# Patient Record
Sex: MALE | Race: WHITE
[De-identification: names, ages, dates, MRNs, and addresses within clinical notes are randomized per-mention and may not be internally consistent; named-entity substitution may affect disease eponyms.]

---

## 2018-06-15 ENCOUNTER — HOSPITAL ENCOUNTER (EMERGENCY)
Dept: HOSPITAL 17 - NEPD | Age: 28
Discharge: LEFT BEFORE DISCHARGE | End: 2018-06-15
Payer: SELF-PAY

## 2018-06-15 VITALS — WEIGHT: 159.84 LBS | HEIGHT: 68 IN | BODY MASS INDEX: 24.22 KG/M2

## 2018-06-15 VITALS
SYSTOLIC BLOOD PRESSURE: 129 MMHG | OXYGEN SATURATION: 99 % | TEMPERATURE: 97.9 F | DIASTOLIC BLOOD PRESSURE: 82 MMHG | RESPIRATION RATE: 18 BRPM | HEART RATE: 92 BPM

## 2018-06-15 DIAGNOSIS — G47.00: Primary | ICD-10-CM

## 2018-06-15 PROCEDURE — 99281 EMR DPT VST MAYX REQ PHY/QHP: CPT

## 2018-06-15 NOTE — PD
HPI


Chief Complaint:  Medication Refill Request


Time Seen by Provider:  16:09


Travel History


International Travel<30 days:  No


Contact w/Intl Traveler<30days:  No


Traveled to known affect area:  No





History of Present Illness


HPI


Patient is a 27-year-old male presenting to the emergency department for a 

medication refill.  Patient states he is on temazepam 30 mg at night.  He has 

been taking this medication for approximately 1 month.  He states that he has 

been out for 2 days and is having a difficult time sleeping.  He reports that 

he went to his primary doctor's office this morning however they were close.  

He has no other complaints at this time.





[facility]


Past Medical History


Insomnia:  Yes





Social History


Tobacco Use:  No





Allergies-Medications


(Allergen,Severity, Reaction):  


Coded Allergies:  


     Penicillins (Verified  Allergy, Severe, Anaphylaxis, 6/15/18)


     albuterol (Verified  Allergy, Severe, Anaphylaxis, 6/15/18)


     ibuprofen (Verified  Allergy, Severe, Swelling, 6/15/18)


     ipratropium (Verified  Allergy, Severe, Anaphylaxis, 6/15/18)


Uncoded Allergies:  


     ARCOXIA (Allergy, Severe, Swelling, 6/15/18)


     PARACETAMOL (Allergy, Severe, Anaphylaxis, 6/15/18)





Review of Systems


Except as stated in HPI:  all other systems reviewed are Neg


General / Constitutional:  Positive: Other (Insomnia)





Physical Exam


Narrative


GENERAL: Well-developed, well-nourished, alert male.  Presenting in no acute 

distress.


SKIN: Warm and dry.


HEAD: Normocephalic.


EYES: No scleral icterus. No injection or drainage. 


NECK: Supple, trachea midline. No JVD or lymphadenopathy.


CARDIOVASCULAR: Regular rate and rhythm without murmurs, gallops, or rubs. 


RESPIRATORY: Breath sounds equal bilaterally. No accessory muscle use.


GASTROINTESTINAL: Abdomen soft, non-tender, nondistended. 


MUSCULOSKELETAL: No cyanosis, or edema. 


BACK: Nontender without obvious deformity. No CVA tenderness.








Data


Data


Last Documented VS





Vital Signs








  Date Time  Temp Pulse Resp B/P (MAP) Pulse Ox O2 Delivery O2 Flow Rate FiO2


 


6/15/18 14:48 97.9 92 18 129/82 (98) 99   











MDM


Medical Decision Making


Medical Screen Exam Complete:  Yes


Emergency Medical Condition:  Yes


Interpretation(s)





Vital Signs








  Date Time  Temp Pulse Resp B/P (MAP) Pulse Ox O2 Delivery O2 Flow Rate FiO2


 


6/15/18 14:48 97.9 92 18 129/82 (98) 99   








Differential Diagnosis


Medication refill versus insomnia versus other


Narrative Course


Patient is well-appearing 17-year-old male presenting for refill of temazepam.  

Patient was advised that we do not refill narcotic medications emergency 

department.  His doctor will be back in office on Monday, he was advised to 

call his doctor to obtain a refill at that time.  Patient does not have to work 

or go to school all week and.  He was encouraged to avoid stimulants prior to 

bedtime, trial of warm bath or meditation or listening to music to help him 

sleep.








A medical screening exam was performed: 


At the time of evaluation the presenting medical condition was determined not 

to be of an emergent nature. 


The patient was given the option of receiving additional care, but declined. 


Patient was given options for additional community resources from which to 

obtain care.


The Patient Has Been advised to seek medical attention for their presenting 

complaint.


The patient has been advised to return to the ER at any time if an emergent 

condition develops.





Diagnosis





 Primary Impression:  


 Encounter for medical screening examination











Beatrice Duarte Yan 15, 2018 16:18